# Patient Record
Sex: MALE | Race: WHITE | NOT HISPANIC OR LATINO | URBAN - METROPOLITAN AREA
[De-identification: names, ages, dates, MRNs, and addresses within clinical notes are randomized per-mention and may not be internally consistent; named-entity substitution may affect disease eponyms.]

---

## 2024-12-06 ENCOUNTER — TELEMEDICINE (OUTPATIENT)
Dept: PRIMARY CARE | Facility: CLINIC | Age: 54
End: 2024-12-06
Payer: COMMERCIAL

## 2024-12-06 ENCOUNTER — APPOINTMENT (OUTPATIENT)
Dept: PRIMARY CARE | Facility: CLINIC | Age: 54
End: 2024-12-06
Payer: COMMERCIAL

## 2024-12-06 DIAGNOSIS — M54.50 ACUTE MIDLINE LOW BACK PAIN WITHOUT SCIATICA: Primary | ICD-10-CM

## 2024-12-06 PROCEDURE — 99214 OFFICE O/P EST MOD 30 MIN: CPT | Performed by: FAMILY MEDICINE

## 2024-12-06 RX ORDER — METHYLPREDNISOLONE 4 MG/1
TABLET ORAL
Qty: 21 TABLET | Refills: 0 | Status: SHIPPED | OUTPATIENT
Start: 2024-12-06 | End: 2024-12-12

## 2024-12-06 RX ORDER — IBUPROFEN 200 MG
200 TABLET ORAL EVERY 6 HOURS
COMMUNITY

## 2024-12-06 RX ORDER — CYCLOBENZAPRINE HCL 5 MG
5 TABLET ORAL 3 TIMES DAILY PRN
Qty: 21 TABLET | Refills: 0 | Status: SHIPPED | OUTPATIENT
Start: 2024-12-06 | End: 2024-12-13

## 2024-12-06 NOTE — PATIENT INSTRUCTIONS
Low back strain-- without radiculopathy  Flexeril as written--  Medrol dose pack--adverse effects discussed in detail (see discharge paperwork)--Last time gained 10# water weight and retained water with  prednisone--  Rest and gentle stretching  Ice and heat alternating  Follow up if worsens or persists      INFORMATION ABOUT THE RISKS AND ADVERSE EFFECTS OF STEROIDS:  They can increase your blood pressure  They can cause trouble sleeping  They can make you griffiths/grumpy  They can increase your appetite (and cause weight gain)  They can cause your lower legs to swell  They can irritate the lining of your stomach so never take on an empty stomach  They can increase your blood sugars (Diabetics must monitor their blood sugars and decrease carbohydrate/sugar intake while taking steroids)  They can cause decreased blood flow to the hip bone causing avascular necrosis (dead bone) that can cause significant disability and surgical intervention.    They can cause delayed wound healing.  They can also cause adrenal suppression (most concerning in people who take steroids daily or frequently)   They cause immunosuppression so you may be more susceptible to germs while taking them and for some time after    When we decide to use steroids we weight the risks and the benefits.      DO NOT take any OTC anti-inflammatories like motrin, aleve, ibuprofen while taking prednisone, use tylenol if you need something for pain.

## 2024-12-06 NOTE — PROGRESS NOTES
"I performed this visit using realtime telehealth tools, including an audio/video OR telephone connection between the patient listed who was located in the [facility] and myself, Maria Teresa Mosqueda (Board certified in the [facility]).  At the start of the visit, I introduced myself as Dr. Barrientos and verified the patients name, , and current physical location.    If they were currently outside of the state of [place], the visit was ended and the patient was referred to alternative means for evaluation and treatment.   The patient was made aware of the limitations of the telehealth visit.  They will not be physically examined and all issues may not be appropriate for a telehealth visit.  If necessary, an in person referral will be made.      DISCLAIMER:   In preparing for this visit and writing this note, I reviewed previous electronic medical records (labs, imaging and medical charts) of the patient available in the physician portal. Significant findings which helped in decision making are recorded in this encounter charting.    All allergies were reviewed with the patient and all medications reconciled with the patient.    Chief complaint:  BACK PAIN    Lower back pain / sciatica / muscle seized.   Happens typically once every 2-3 years and   resolves with muscle relaxant and steroid. \  Happened yesterday, mobility is not as bad as typical….meaning I’m up and walking,   can bend somewhat (to put on socks, for example), mild to sharp pain.   Took 2 200mg IB.       Runs marathons  Can feel it coming on hips glutes LB is tight--  Worse in the winter--Snow shoveling  Or aggravated if working on glutes strength--squat or [place] dead lift  Feeling kind of \"crunchy\" lately--    Yesterday went out in a hurry--felt a little tweak and usually within 15-30 mins everything is seized and flat on back  Was at home  Laid down flat and tried to do mobility  2 IB  Gentle stretches    Normally 90% flat on back--can get up and " barely walks  Now at 60% and can get up and moving around--so this time not as bad    Usually gets massage rest heat  Then muscle relaxants and steroids gets him 90% and then 2-3 days he feels 100% better    Last time needed steroids--summer 2023    Sacro-coccyx into hips--does not go down legs-- does not feel nerve-dawson  No pain down legs-sciatica  No weakness or tingling  Has decreased ROM--  Barely able to get foot up to get socks on--  No saddle anesthesia--  No loss of bowel or bladder pain  [facility] summer--told he had scoliosis--    Low mileage running now--    All other ROS (-)    Alert in NAD  Eyes clear  No resp distress or coughing  Affect wnl  Flexion of back-- less   Extension of back--feels stiff  Side to side-- typical ROM--  Twisting--feels good--    Low back strain-- without radiculopathy  Flexeril as written--  Medrol dose pack--adverse effects discussed in detail (see discharge paperwork)--Last time gained 10# water weight and retained water with  prednisone--  Rest and gentle stretching  Ice and heat alternating  Follow up if worsens or persists    At the completion of the visit, possible diagnoses and plan was discussed with the patient as well as recommended treatments, medications prescribed, and when to follow up for in person evaluation. All questions were answered and the patient verbalized understanding.  Limitations to telemedicine include inability to do a complete and accurate physical exam.  Any concerns regarding this were conveyed with the patient and in person follow-up recommended if patient nature of illness does not progress as anticipated during this visit.